# Patient Record
Sex: FEMALE | HISPANIC OR LATINO | Employment: PART TIME | URBAN - METROPOLITAN AREA
[De-identification: names, ages, dates, MRNs, and addresses within clinical notes are randomized per-mention and may not be internally consistent; named-entity substitution may affect disease eponyms.]

---

## 2021-03-10 ENCOUNTER — OFFICE VISIT (OUTPATIENT)
Dept: URGENT CARE | Facility: CLINIC | Age: 46
End: 2021-03-10
Payer: COMMERCIAL

## 2021-03-10 ENCOUNTER — TELEPHONE (OUTPATIENT)
Dept: URGENT CARE | Facility: CLINIC | Age: 46
End: 2021-03-10

## 2021-03-10 VITALS
OXYGEN SATURATION: 99 % | WEIGHT: 120.4 LBS | HEIGHT: 62 IN | SYSTOLIC BLOOD PRESSURE: 116 MMHG | DIASTOLIC BLOOD PRESSURE: 70 MMHG | HEART RATE: 104 BPM | BODY MASS INDEX: 22.16 KG/M2 | TEMPERATURE: 99.6 F | RESPIRATION RATE: 18 BRPM

## 2021-03-10 DIAGNOSIS — L50.9 URTICARIAL RASH: ICD-10-CM

## 2021-03-10 PROCEDURE — 99204 OFFICE O/P NEW MOD 45 MIN: CPT | Performed by: NURSE PRACTITIONER

## 2021-03-10 RX ORDER — METHYLPREDNISOLONE SODIUM SUCCINATE 125 MG/2ML
125 INJECTION, POWDER, LYOPHILIZED, FOR SOLUTION INTRAMUSCULAR; INTRAVENOUS ONCE
Status: COMPLETED | OUTPATIENT
Start: 2021-03-10 | End: 2021-03-10

## 2021-03-10 RX ORDER — METHYLPREDNISOLONE 4 MG/1
TABLET ORAL
Qty: 21 TABLET | Refills: 0 | Status: SHIPPED | OUTPATIENT
Start: 2021-03-10

## 2021-03-10 RX ADMIN — METHYLPREDNISOLONE SODIUM SUCCINATE 125 MG: 125 INJECTION, POWDER, LYOPHILIZED, FOR SOLUTION INTRAMUSCULAR; INTRAVENOUS at 12:13

## 2021-03-10 ASSESSMENT — ENCOUNTER SYMPTOMS
PALPITATIONS: 0
VOMITING: 0
WHEEZING: 0
RHINORRHEA: 0
HEARTBURN: 0
COUGH: 0
ANOREXIA: 0
BACK PAIN: 0
ORTHOPNEA: 0
CHILLS: 0
NAUSEA: 0
SHORTNESS OF BREATH: 0
NAIL CHANGES: 0
SORE THROAT: 0
MYALGIAS: 0
DIZZINESS: 0
FATIGUE: 0
HEADACHES: 0
DIARRHEA: 0
TINGLING: 0
CONSTIPATION: 0
FEVER: 0
URTICARIA: 1
MEMORY LOSS: 0

## 2021-03-10 NOTE — PROGRESS NOTES
"Subjective:      Ping Yeboah is a 45 y.o. female who presents with Urticaria (x15 hrs)            Urticaria  This is a new problem. The current episode started yesterday. The problem has been gradually worsening since onset. The rash is diffuse. The rash is characterized by redness and itchiness. She was exposed to nothing. Pertinent negatives include no anorexia, congestion, cough, diarrhea, facial edema, fatigue, fever, joint pain, nail changes, rhinorrhea, shortness of breath, sore throat or vomiting. Past treatments include antihistamine and anti-itch cream. The treatment provided no relief. There is no history of allergies, asthma, eczema or varicella.       Review of Systems   Constitutional: Negative for chills, fatigue and fever.   HENT: Negative for congestion, ear pain, rhinorrhea and sore throat.    Respiratory: Negative for cough, shortness of breath and wheezing.    Cardiovascular: Negative for chest pain, palpitations, orthopnea and leg swelling.   Gastrointestinal: Negative for anorexia, constipation, diarrhea, heartburn, nausea and vomiting.   Musculoskeletal: Negative for back pain, joint pain and myalgias.   Skin: Positive for itching and rash. Negative for nail changes.   Neurological: Negative for dizziness, tingling and headaches.   Psychiatric/Behavioral: Negative for memory loss and suicidal ideas.   All other systems reviewed and are negative.         Objective:     /70 (BP Location: Right arm, Patient Position: Sitting)   Pulse (!) 104   Temp 37.6 °C (99.6 °F) (Tympanic)   Resp 18   Ht 1.575 m (5' 2\")   Wt 54.6 kg (120 lb 6.4 oz)   SpO2 99%   BMI 22.02 kg/m²      Physical Exam  Vitals reviewed.   Constitutional:       General: She is not in acute distress.     Appearance: Normal appearance. She is not ill-appearing.   HENT:      Head: Normocephalic and atraumatic.      Right Ear: External ear normal.      Left Ear: External ear normal.   Eyes:      Pupils: Pupils are equal, " round, and reactive to light.   Cardiovascular:      Rate and Rhythm: Regular rhythm. Tachycardia present.      Pulses: Normal pulses.      Heart sounds: No murmur. No friction rub. No gallop.    Pulmonary:      Effort: Pulmonary effort is normal. No respiratory distress.      Breath sounds: Normal breath sounds.   Abdominal:      General: Bowel sounds are normal. There is no distension.      Palpations: Abdomen is soft. There is no mass.   Musculoskeletal:         General: No tenderness. Normal range of motion.      Cervical back: Normal range of motion and neck supple. No tenderness.      Right lower leg: No edema.      Left lower leg: No edema.   Lymphadenopathy:      Cervical: No cervical adenopathy.   Skin:     General: Skin is warm and dry.      Findings: Rash present. Rash is urticarial.      Comments: Diffuse urticarial rash inclusive of face, trunk, bilateral upper and lower extremities sparing mouth, palms and soles.    Neurological:      Mental Status: She is alert and oriented to person, place, and time.   Psychiatric:         Mood and Affect: Mood normal.         Behavior: Behavior normal.                 Assessment/Plan:        1. Urticarial rash  methylPREDNISolone sod succ (SOLU-MEDROL) 125 MG injection 125 mg    methylPREDNISolone (MEDROL DOSEPAK) 4 MG Tablet Therapy Pack     Differential diagnosis, natural history, supportive care, and indications for immediate follow-up discussed at length.     F/u PCP for allergy testing.     Supportive care, differential diagnoses, and indications for immediate follow-up discussed with patient.    Pathogenesis of diagnosis discussed including typical length and natural progression. Patient expresses understanding and agrees to plan.     Instructed patient to follow up with PCP or return to clinic for worsening symptoms or symptoms that persist for 7 to 10 days     Please note that this dictation was created using voice recognition software. I have made every  reasonable attempt to correct obvious errors, but I expect that there are errors of grammar and possibly content that I did not discover before finalizing the note.

## 2021-03-11 NOTE — TELEPHONE ENCOUNTER
Pt called stating prescription could not be filled until Friday at current pharmacy and requested prescription be sent to [facility] on [address]. Called [facility] and had prescription filled and informed Pt. No questions/concerns.

## 2025-06-27 ENCOUNTER — OFFICE VISIT (OUTPATIENT)
Dept: PHYSICAL MEDICINE AND REHAB | Facility: MEDICAL CENTER | Age: 50
End: 2025-06-27
Payer: COMMERCIAL

## 2025-06-27 VITALS
HEIGHT: 62 IN | BODY MASS INDEX: 21.22 KG/M2 | HEART RATE: 97 BPM | DIASTOLIC BLOOD PRESSURE: 84 MMHG | SYSTOLIC BLOOD PRESSURE: 138 MMHG | TEMPERATURE: 97.8 F | OXYGEN SATURATION: 98 % | WEIGHT: 115.3 LBS

## 2025-06-27 DIAGNOSIS — M54.9 DORSALGIA: ICD-10-CM

## 2025-06-27 DIAGNOSIS — M79.18 GLUTEAL PAIN: ICD-10-CM

## 2025-06-27 DIAGNOSIS — M21.70 LEG LENGTH DISCREPANCY: ICD-10-CM

## 2025-06-27 DIAGNOSIS — M54.16 LUMBAR RADICULOPATHY: Primary | ICD-10-CM

## 2025-06-27 DIAGNOSIS — M79.10 MYALGIA: ICD-10-CM

## 2025-06-27 DIAGNOSIS — M43.16 SPONDYLOLISTHESIS OF LUMBAR REGION: ICD-10-CM

## 2025-06-27 DIAGNOSIS — M47.816 FACET ARTHRITIS OF LUMBAR REGION: ICD-10-CM

## 2025-06-27 DIAGNOSIS — Z71.82 EXERCISE COUNSELING: ICD-10-CM

## 2025-06-27 DIAGNOSIS — M47.9 SPONDYLOSIS: ICD-10-CM

## 2025-06-27 ASSESSMENT — PAIN SCALES - GENERAL: PAINLEVEL_OUTOF10: 7=MODERATE-SEVERE PAIN

## 2025-06-27 ASSESSMENT — PATIENT HEALTH QUESTIONNAIRE - PHQ9: CLINICAL INTERPRETATION OF PHQ2 SCORE: 0

## 2025-06-27 NOTE — PROGRESS NOTES
Physiatry (Physical Medicine and  Rehabilitation)       Patient Name: Ping Yeboah   Patient : 1975  PCP: Pelon Sanchez M.D.  MRN: [id number]     Date of service: See epic    Referring provider: Patricia Long A.P*    CHIEF COMPLAINT  Chief Complaint   Patient presents with    Establish Care     Radiculopathy, Lumbar Region         Ping Yeboah is a very pleasant female  who presents today with The primary encounter diagnosis was Lumbar radiculopathy. Diagnoses of Spondylosis, Facet arthritis of lumbar region, Exercise counseling, Spondylolisthesis of lumbar region, Myalgia, Gluteal pain, Dorsalgia, and Leg length discrepancy were also pertinent to this visit.    Verbal consent was obtained for Herb copilot: Yes      Medical records review:  I reviewed the note from the referring provider Patricia Long A.P* including the note dated 25.    Prior Relevant MSK/Interventional Procedures:   Patient has not attempted prior ortho/joint injections.   Patient has not had prior ortho/joint surgery.      services were used in the patient's primary language of [place].      Name or Number: Juan [id number]  Mode of interpretation: iPad      HPI:    History of Present Illness  The patient is a 49-year-old female who came in because of ongoing back and leg pain. She described having pain in both the front and back of her legs, along with pain in both knees. She said the pain has been at a level of 7 out of 10 for the past 5 months, but taking ibuprofen helps bring it down to a 5 out of 10. The pain is there all day long. She mentioned that she moved heavy furniture in late May while doing deep cleaning, which might have made the pain worse. She has work restrictions that prevent her from lifting more than 20 pounds, and she's also worried about her back making popping sounds. She hasn't noticed any changes in her urinary or bowel habits.    Back and Leg Pain  She shared that the  pain runs along her entire spine and goes down to her knees, with the pain being worse on the front side of her legs. She doesn't feel any weakness in her muscles and hasn't had any injections or surgeries for her back. She also hasn't had any fevers, chills, or night sweats. She described the pain as feeling like cramping and said her back cracks a lot. She feels her pain is worse now compared to when she had an x-ray in February. She was prescribed meloxicam, Flexeril, and gabapentin, but she stopped taking them because they made her too sleepy during the day. However, she found that taking them at night helped her sleep better. Meloxicam gave her some relief during the day, but not much. She has been doing physical therapy for the past 8 to 10 weeks, but it hasn't helped.  - Onset: Pain has been ongoing for the past 5 months, possibly worsened by moving heavy furniture in late May.  - Location: Pain in the entire spine, front and back of legs, and both knees.  - Duration: Persistent for the past 5 months.  - Character: Cramping pain, back cracking a lot.  - Alleviating/Aggravating Factors: Ibuprofen helps reduce pain from 7/10 to 5/10; meloxicam provides some relief; physical therapy for 8 to 10 weeks without improvement; pain possibly worsened by moving heavy furniture.  - Timing: Pain is present all day long.  - Severity: Pain level 7/10, reduced to 5/10 with ibuprofen; worse now compared to February.    She also mentioned that she feels like her right leg is shorter than her left. Her doctor suggested using foot soles, but she didn't like them and decided not to use them.    ALLERGIES  The patient had a rash with NAPROXEN.    MEDICATIONS  Current: Meloxicam, Flexeril, gabapentin.  Discontinued: Ibuprofen.    ROS:   Fever, Chills, Sweats: Denies  Involuntary Weight Loss: Denies  Bowel/bladder issues: denies   See HPI.   All other systems reviewed and negative.     OCCUPATIONAL history:     HOBBIES/ACTIVITIES:     GOALS OF TREATMENT: Symptom/Pain relief. improve function.      Psychological testing for pain as depression and pain commonly coexist and need to both be treated.     Opioid Risk Score: No value filed.      Interpretation of Opioid Risk Score   Score 0-3 = Low risk of abuse. Do UDS at least once per year.  Score 4-7 = Moderate risk of abuse. Do UDS 1-4 times per year.  Score 8+ = High risk of abuse. Refer to specialist.    PHQ      6/27/2025     4:20 PM   Depression Screen (PHQ-2/PHQ-9)   PHQ-2 Total Score 0       Interpretation of PHQ-9 Total Score   Score Severity   1-4 No Depression   5-9 Mild Depression   10-14 Moderate Depression   15-19 Moderately Severe Depression   20-27 Severe Depression      PMHx:   Past Medical History[1]    PSHx:   Past Surgical History[2]    Family history   History reviewed. No pertinent family history.    Medications: reviewed on epic.   Active Medications[3]     Allergies:   Allergies[4]    Social Hx:   Social History     Socioeconomic History    Marital status: Single     Spouse name: Not on file    Number of children: Not on file    Years of education: Not on file    Highest education level: Not on file   Occupational History    Not on file   Tobacco Use    Smoking status: Never    Smokeless tobacco: Never   Vaping Use    Vaping status: Never Used   Substance and Sexual Activity    Alcohol use: Never    Drug use: Never    Sexual activity: Not on file   Other Topics Concern    Not on file   Social History Narrative    Not on file     Social Drivers of Health     Financial Resource Strain: Not on file   Food Insecurity: Not on file   Transportation Needs: Not on file   Physical Activity: Not on file   Stress: Not on file   Social Connections: Not on file   Intimate Partner Violence: Not on file   Housing Stability: Not on file         EXAMINATION   Vitals: /84 (BP Location: Right arm, Patient Position: Sitting, BP Cuff Size: Adult)   Pulse 97   Temp  "36.6 °C (97.8 °F) (Temporal)   Ht 1.575 m (5' 2\")   Wt 52.3 kg (115 lb 4.8 oz)   SpO2 98%   Physical Exam:     A chaperone was offered to the patient during today's exam. Chaperone name: Evelyn Nuñez MA was present.      Body Habitus: Body mass index is 21.09 kg/m².  Appearance: Well-groomed, well-nourished, not disheveled  Eyes: No scleral icterus to suggest severe liver disease, no proptosis to suggest severe hyperthyroid  ENT -no obvious auditory deficits, no external lesions, moist mucus membranes   Skin -no rashes or lesions noted. No appreciable skin breakdown on exposed skin areas.    Respiratory-  breathing comfortably on room air, no audible wheezing, full sentences  Cardiovascular- No lower extremity edema noted.   Psychiatric- alert and oriented, calm, comfortable, cooperative     Musculoskeletal and Neuro:  Gait and station - normal gait with reciprocal pattern,  no presence/use of ambulatory device, no arm assistance with sit-to-stand, nonantalgic. no loss of balance during exam.  change in patient's demeanor with exam.    Grossly normal cranial nerve exam  Coordination grossly intact  Physical Exam  - Deep tendon reflex BLE: 2 out of 4  - No clonus on examination  - Manual motor testing L2-S1 BLE: 5 out of 5  - Light touch dermatomes L2-S1: Equal and symmetrical to her face  - Tenderness to palpation in the midline of the spine, thoracic down to the lumbar; generalized  - No paraspinal pain of the thoracic, lumbar regions  - Right gluteus pain  - No pain with palpation of the greater trochanter  - Difficulty with flexion and extension causing pain across her back, unable to extend her back  - Difficulty with standing to sit  - Negative straight leg raise, Lasegue's test of the left side  - Negative FAY, FADIR test, thigh thrust test  - Right leg: Pain located at the gluteus muscle with the straight leg raise test, pain with thigh thrust test and manipulation of her leg  - No significant difference " "in leg length after checking for leg discrepancy      MEDICAL DECISION MAKING    Medical records review: see under HPI section.     DATA    Labs:   Lab Results   Component Value Date/Time    SODIUM 138 2013 09:35 AM    POTASSIUM 3.6 2013 09:35 AM    CHLORIDE 104 2013 09:35 AM    CO2 25 2013 09:35 AM    ANION 9.0 2013 09:35 AM    GLUCOSE 109 (H) 2013 09:35 AM    BUN 11 2013 09:35 AM    CREATININE 0.60 2013 09:35 AM    CALCIUM 8.7 2013 09:35 AM    ASTSGOT 32 2013 09:35 AM    ALTSGPT 32 2013 09:35 AM    TBILIRUBIN 0.5 2013 09:35 AM    ALBUMIN 4.0 2013 09:35 AM    TOTPROTEIN 7.1 2013 09:35 AM    GLOBULIN 3.1 2013 09:35 AM    AGRATIO 1.3 2013 09:35 AM   ]    No results found for: \"PROTHROMBTM\", \"INR\"     Lab Results   Component Value Date/Time    WBC 7.5 2013 09:35 AM    RBC 4.73 2013 09:35 AM    HEMOGLOBIN 14.0 2013 09:35 AM    HEMATOCRIT 41.4 2013 09:35 AM    MCV 87.5 2013 09:35 AM    MCH 29.6 2013 09:35 AM    MCHC 33.9 2013 09:35 AM    MPV 5.8 (L) 2013 09:35 AM    NEUTSPOLYS 58.6 2013 09:35 AM    LYMPHOCYTES 35.0 2013 09:35 AM    MONOCYTES 3.1 2013 09:35 AM    EOSINOPHILS 2.5 2013 09:35 AM    BASOPHILS 0.7 2013 09:35 AM        No results found for: \"HBA1C\"     Imaging:   I personally reviewed following images, these are my reads    Not available    IMAGING radiology reads. I reviewed the following radiology reads                                                                                                                         ASSESSMENT AND PLAN:  Ping Yeboah   : 1975   Past medical history of migraines, GERD, iron deficiency, cervical radiculopathy, GERD, uterine adnexa, trigger finger of the right hand    Diagnoses and all orders for this visit:  1. Lumbar radiculopathy  DX-LUMBAR SPINE-2 OR 3 VIEWS    DX-HIP-BILATERAL-WITH " PELVIS-3/4 VIEWS      2. Spondylosis        3. Facet arthritis of lumbar region        4. Exercise counseling        5. Spondylolisthesis of lumbar region        6. Myalgia  Trigger Point Injections      7. Gluteal pain  DX-LUMBAR SPINE-2 OR 3 VIEWS    DX-HIP-BILATERAL-WITH PELVIS-3/4 VIEWS      8. Dorsalgia  DX-THORACIC SPINE-2 VIEWS      9. Leg length discrepancy  DX-BONE LENGTH STUDY            Assessment & Plan  1.  Lower back pain.  Differential includes lumbar radiculopathy, myalgia, vertebrogenic, hamstring tightness.  - Consider trigger point injections targeting the gluteus muscle, pending MRI review.  - Order x-rays of the hips, lower back, and upper back with generalized pain.  - Review MRI results to guide further treatment decisions.  - Recommend gabapentin and Flexeril at bedtime due to sedative effects.  - Advise meloxicam during the day with food and milk.  - Suggest Tylenol as an adjunct for daytime pain management.  - Provide home exercises for back strengthening.  - Staff to provide instructions regarding x-rays and trigger point injections.    2. Suspected leg length discrepancy informed by PCP and PT.  - Order x-ray to confirm leg length discrepancy and assess the need for an insert.    Follow-up  - Plan to administer trigger point injections within 1 to 2 weeks, pending insurance authorization.    -Recommend vitamin D screening; Hypovitaminosis D can contribute to diffuse musculoskeletal aches, fatigue, and malaise and can affect exercise and therapy    Orders Placed This Encounter    Trigger Point Injections    DX-LUMBAR SPINE-2 OR 3 VIEWS    DX-HIP-BILATERAL-WITH PELVIS-3/4 VIEWS    DX-THORACIC SPINE-2 VIEWS    DX-BONE LENGTH STUDY       -Medications/Modalities:  see above  -Therapy (PT/OT/Aquatherapy): encouraged to continue at [facility]  -Home exercise program: encouraged    -Diagnostic workup: reviewed today as above; ordered X-ray  -Interventional program: will schedule an outpatient  procedure at a later time: TPI right gluteal depending on MRI results  -Referrals: none required at this time  -Outside records requested: The patient signed Outside Records Request Form for her outside records including images. [facility]     Please note that this dictation was created using voice recognition software. I have made every reasonable attempt to correct obvious errors but there may be errors of grammar and content that I may have overlooked prior to finalization of this note.      Bonilla Nickerson DO  Interventional Pain and Spine  Physical Medicine and Rehabilitation  [facility]         CC Pelon Sanchez M.D.   CC Patricia Long, A.P*         [1] History reviewed. No pertinent past medical history.  [2] History reviewed. No pertinent surgical history.  [3]   Outpatient Medications Marked as Taking for the 6/27/25 encounter (Office Visit) with Bonilla Nickerson D.O.   Medication Sig Dispense Refill    methylPREDNISolone (MEDROL DOSEPAK) 4 MG Tablet Therapy Pack Follow schedule on package instructions. 21 tablet 0   [4] No Known Allergies

## 2025-06-27 NOTE — Clinical Note
Patricia Long and Dr Pelon Sanchez   Ping Yeboah was evaluated for low back pain.  Considering her back pain secondary to myalgia but she also has generalized midline pain which just can be imaged with x-rays and have her follow-up in 2 weeks.  She also mention a leg length discrepancy and will obtain x-rays.  Lastly, consider vitamin D level as this can affect  toleration to exercise and physical therapy.  Only recommending that she consider just taking the Flexeril and gabapentin at night since it is making her drowsy.  Suggested that she continue with the treatment with meloxicam and to consider the adjunct of Tylenol during the daytime  Thank you for the referral.   Please text, voalte, or call me if further questions.       Best Regards,   Bonilla Nickerson, DO   Physical Medicine and Rehabilitation

## 2025-06-29 ENCOUNTER — HOSPITAL ENCOUNTER (OUTPATIENT)
Dept: RADIOLOGY | Facility: MEDICAL CENTER | Age: 50
End: 2025-06-29
Attending: GENERAL PRACTICE
Payer: COMMERCIAL

## 2025-06-29 DIAGNOSIS — M79.18 GLUTEAL PAIN: ICD-10-CM

## 2025-06-29 DIAGNOSIS — M54.9 DORSALGIA: ICD-10-CM

## 2025-06-29 DIAGNOSIS — M54.16 LUMBAR RADICULOPATHY: ICD-10-CM

## 2025-06-29 PROCEDURE — 72070 X-RAY EXAM THORAC SPINE 2VWS: CPT

## 2025-06-29 PROCEDURE — 72100 X-RAY EXAM L-S SPINE 2/3 VWS: CPT

## 2025-07-03 ENCOUNTER — OFFICE VISIT (OUTPATIENT)
Dept: PHYSICAL MEDICINE AND REHAB | Facility: MEDICAL CENTER | Age: 50
End: 2025-07-03
Attending: GENERAL PRACTICE
Payer: COMMERCIAL

## 2025-07-03 VITALS
HEART RATE: 80 BPM | TEMPERATURE: 98 F | OXYGEN SATURATION: 98 % | BODY MASS INDEX: 21.16 KG/M2 | WEIGHT: 115 LBS | HEIGHT: 62 IN | SYSTOLIC BLOOD PRESSURE: 142 MMHG | DIASTOLIC BLOOD PRESSURE: 86 MMHG

## 2025-07-03 DIAGNOSIS — M79.10 MYALGIA: ICD-10-CM

## 2025-07-03 DIAGNOSIS — M47.9 SPONDYLOSIS: Primary | ICD-10-CM

## 2025-07-03 DIAGNOSIS — M43.16 SPONDYLOLISTHESIS OF LUMBAR REGION: ICD-10-CM

## 2025-07-03 DIAGNOSIS — M47.816 FACET ARTHRITIS OF LUMBAR REGION: ICD-10-CM

## 2025-07-03 DIAGNOSIS — Z71.82 EXERCISE COUNSELING: ICD-10-CM

## 2025-07-03 PROCEDURE — 76942 ECHO GUIDE FOR BIOPSY: CPT | Performed by: GENERAL PRACTICE

## 2025-07-03 PROCEDURE — 1125F AMNT PAIN NOTED PAIN PRSNT: CPT | Performed by: GENERAL PRACTICE

## 2025-07-03 PROCEDURE — 20553 NJX 1/MLT TRIGGER POINTS 3/>: CPT | Performed by: GENERAL PRACTICE

## 2025-07-03 PROCEDURE — 99213 OFFICE O/P EST LOW 20 MIN: CPT | Mod: 25 | Performed by: GENERAL PRACTICE

## 2025-07-03 PROCEDURE — 3079F DIAST BP 80-89 MM HG: CPT | Performed by: GENERAL PRACTICE

## 2025-07-03 PROCEDURE — 3077F SYST BP >= 140 MM HG: CPT | Performed by: GENERAL PRACTICE

## 2025-07-03 RX ORDER — BUPIVACAINE HYDROCHLORIDE 5 MG/ML
10 INJECTION, SOLUTION EPIDURAL; INTRACAUDAL; PERINEURAL ONCE
Status: COMPLETED | OUTPATIENT
Start: 2025-07-03 | End: 2025-07-03

## 2025-07-03 RX ADMIN — Medication 5 ML: at 17:13

## 2025-07-03 RX ADMIN — BUPIVACAINE HYDROCHLORIDE 10 ML: 5 INJECTION, SOLUTION EPIDURAL; INTRACAUDAL; PERINEURAL at 17:12

## 2025-07-03 ASSESSMENT — PAIN SCALES - GENERAL: PAINLEVEL_OUTOF10: 7=MODERATE-SEVERE PAIN

## 2025-07-03 ASSESSMENT — PATIENT HEALTH QUESTIONNAIRE - PHQ9: CLINICAL INTERPRETATION OF PHQ2 SCORE: 0

## 2025-07-03 NOTE — PROGRESS NOTES
Physiatry (Physical Medicine and  Rehabilitation)       Patient Name: Ping Yeboah   Patient : 1975  PCP: Pelon Sanchez M.D.  MRN: [id number]     Date of service: See epic    Referring provider: Patricia Long A.P*    CHIEF COMPLAINT  Chief Complaint   Patient presents with    Injections     TPI         Ping Yeboah is a very pleasant Finnish-speaking female  who presents today with The primary encounter diagnosis was Spondylosis. Diagnoses of Myalgia, Facet arthritis of lumbar region, Spondylolisthesis of lumbar region, and Exercise counseling were also pertinent to this visit.    Verbal consent was obtained for Herb copilot: Yes      Medical records review:  I reviewed the note from the referring provider Patricia Long A.P* including the note dated 25.    Prior Relevant MSK/Interventional Procedures:   Patient has not attempted prior ortho/joint injections.   Patient has not had prior ortho/joint surgery.       HPI:     services were used in the patient's primary language of Finnish.      Name or Number: [id number] [id number]  Mode of interpretation: iPad    History of Present Illness  The patient is a 49-year-old female presenting for a trigger point injection, with x-rays completed and having paraspinal, thoracic, and lumbar pain.    Paraspinal, Thoracic, and Lumbar Pain  She has been on medication for an extended period, but her pain remains unrelieved. Her primary care physician, Dr. Bradford, has informed her that no additional medications will be prescribed as she is currently under treatment here. If she requires any medication for pain, she is to request it from this provider. She expresses a preference for injections over oral medication due to the lack of improvement in her pain despite long-term medication use. She was recently seen by her primary care physician and was prescribed meloxicam, Flexeril, and gabapentin, which have not significantly alleviated her  symptoms.  - Onset: Pain has been ongoing for an extended period.  - Location: Paraspinal, thoracic, and lumbar regions.  - Duration: Extended period.  - Character: Persistent pain.  - Alleviating/Aggravating Factors: Preference for injections over oral medication; meloxicam, Flexeril, and gabapentin have not significantly alleviated symptoms.  - Severity: Pain remains unrelieved despite long-term medication use.    MEDICATIONS  Current: Meloxicam, Flexeril, gabapentin    ROS:   Fever, Chills, Sweats: Denies  Involuntary Weight Loss: Denies  Bowel/bladder issues: denies   See HPI.   All other systems reviewed and negative.     OCCUPATIONAL history:    HOBBIES/ACTIVITIES:     GOALS OF TREATMENT: Symptom/Pain relief. improve function.      Psychological testing for pain as depression and pain commonly coexist and need to both be treated.     Opioid Risk Score: No value filed.      Interpretation of Opioid Risk Score   Score 0-3 = Low risk of abuse. Do UDS at least once per year.  Score 4-7 = Moderate risk of abuse. Do UDS 1-4 times per year.  Score 8+ = High risk of abuse. Refer to specialist.    PHQ      6/27/2025     4:20 PM 7/3/2025     2:40 PM   Depression Screen (PHQ-2/PHQ-9)   PHQ-2 Total Score 0 0       Interpretation of PHQ-9 Total Score   Score Severity   1-4 No Depression   5-9 Mild Depression   10-14 Moderate Depression   15-19 Moderately Severe Depression   20-27 Severe Depression      PMHx:   Past Medical History[1]    PSHx:   Past Surgical History[2]    Family history   History reviewed. No pertinent family history.    Medications: reviewed on epic.   Active Medications[3]     Allergies:   Allergies[4]    Social Hx:   Social History     Socioeconomic History    Marital status: Single     Spouse name: Not on file    Number of children: Not on file    Years of education: Not on file    Highest education level: Not on file   Occupational History    Not on file   Tobacco Use    Smoking status: Never  "   Smokeless tobacco: Never   Vaping Use    Vaping status: Never Used   Substance and Sexual Activity    Alcohol use: Never    Drug use: Never    Sexual activity: Not on file   Other Topics Concern    Not on file   Social History Narrative    Not on file     Social Drivers of Health     Financial Resource Strain: Not on file   Food Insecurity: Not on file   Transportation Needs: Not on file   Physical Activity: Not on file   Stress: Not on file   Social Connections: Not on file   Intimate Partner Violence: Not on file   Housing Stability: Not on file         EXAMINATION   Vitals: BP (!) 142/86 (BP Location: Right arm, Patient Position: Sitting, BP Cuff Size: Adult)   Pulse 80   Temp 36.7 °C (98 °F) (Temporal)   Ht 1.575 m (5' 2\")   Wt 52.2 kg (115 lb)   SpO2 98%   Physical Exam:     A chaperone was offered to the patient during today's exam. Chaperone name: Evelyn Nuñez MA was present.      Body Habitus: Body mass index is 21.03 kg/m².  Appearance: Well-groomed, well-nourished, not disheveled  Eyes: No scleral icterus to suggest severe liver disease, no proptosis to suggest severe hyperthyroid  ENT -no obvious auditory deficits, no external lesions, moist mucus membranes   Skin -no rashes or lesions noted. No appreciable skin breakdown on exposed skin areas.    Respiratory-  breathing comfortably on room air, no audible wheezing, full sentences  Cardiovascular- No lower extremity edema noted.   Psychiatric- alert and oriented, calm, comfortable, cooperative     Musculoskeletal and Neuro:  Gait and station - normal gait with reciprocal pattern,  no presence/use of ambulatory device, no arm assistance with sit-to-stand, nonantalgic. no loss of balance during exam.  change in patient's demeanor with exam.    Grossly normal cranial nerve exam  Coordination grossly intact  Physical Exam  - Deep tendon reflex BLE: 2 out of 4  - No clonus on examination  - Manual motor testing L2-S1 BLE: 5 out of 5  - Light touch " "dermatomes L2-S1: Equal and symmetrical to her face  - Tenderness to palpation in the midline of the spine, thoracic down to the lumbar; generalized  - No paraspinal pain of the thoracic, lumbar regions  - Right gluteus pain  - No pain with palpation of the greater trochanter  - Difficulty with flexion and extension causing pain across her back, unable to extend her back  - Difficulty with standing to sit  - Negative straight leg raise, Lasegue's test of the left side  - Negative FAY, FADIR test, thigh thrust test  - Right leg: Pain located at the gluteus muscle with the straight leg raise test, pain with thigh thrust test and manipulation of her leg  - No significant difference in leg length after checking for leg discrepancy      MEDICAL DECISION MAKING    Medical records review: see under HPI section.     DATA    Labs:   Lab Results   Component Value Date/Time    SODIUM 138 05/13/2013 09:35 AM    POTASSIUM 3.6 05/13/2013 09:35 AM    CHLORIDE 104 05/13/2013 09:35 AM    CO2 25 05/13/2013 09:35 AM    ANION 9.0 05/13/2013 09:35 AM    GLUCOSE 109 (H) 05/13/2013 09:35 AM    BUN 11 05/13/2013 09:35 AM    CREATININE 0.60 05/13/2013 09:35 AM    CALCIUM 8.7 05/13/2013 09:35 AM    ASTSGOT 32 05/13/2013 09:35 AM    ALTSGPT 32 05/13/2013 09:35 AM    TBILIRUBIN 0.5 05/13/2013 09:35 AM    ALBUMIN 4.0 05/13/2013 09:35 AM    TOTPROTEIN 7.1 05/13/2013 09:35 AM    GLOBULIN 3.1 05/13/2013 09:35 AM    AGRATIO 1.3 05/13/2013 09:35 AM   ]    No results found for: \"PROTHROMBTM\", \"INR\"     Lab Results   Component Value Date/Time    WBC 7.5 05/13/2013 09:35 AM    RBC 4.73 05/13/2013 09:35 AM    HEMOGLOBIN 14.0 05/13/2013 09:35 AM    HEMATOCRIT 41.4 05/13/2013 09:35 AM    MCV 87.5 05/13/2013 09:35 AM    MCH 29.6 05/13/2013 09:35 AM    MCHC 33.9 05/13/2013 09:35 AM    MPV 5.8 (L) 05/13/2013 09:35 AM    NEUTSPOLYS 58.6 05/13/2013 09:35 AM    LYMPHOCYTES 35.0 05/13/2013 09:35 AM    MONOCYTES 3.1 05/13/2013 09:35 AM    EOSINOPHILS 2.5 " "2013 09:35 AM    BASOPHILS 0.7 2013 09:35 AM        No results found for: \"HBA1C\"     Imaging:   I personally reviewed following images, these are my reads    2025 thoracic x-ray: Very minimal S-shaped curve; degenerative changes; osteophytes; multiple levels of loss of disc height  2025 lumbar x-ray: Degenerative changes; lower level facet arthropathy L4-L5 and L5-S1; anterolisthesis L4; osteophytes    IMAGING radiology reads. I reviewed the following radiology reads                                                                                                                         ASSESSMENT AND PLAN:  Ping Yeboah   : 1975   Past medical history of migraines, GERD, iron deficiency, cervical radiculopathy, GERD, uterine adnexa, trigger finger of the right hand    Diagnoses and all orders for this visit:  1. Spondylosis        2. Myalgia  Trigger Point Injections    lidocaine (Xylocaine) 1 % injection 5 mL    bupivacaine (pf) (Marcaine/Sensorcaine) 0.5 % injection 10 mL    Consent for all Surgical, Special Diagnostic or Therapeutic Procedures      3. Facet arthritis of lumbar region        4. Spondylolisthesis of lumbar region        5. Exercise counseling              Assessment & Plan  The current working diagnosis is myalgia, characterized by muscle dysfunction and tenderness to palpation. The muscles can be affected if they are not flexible, if they do not have the strength, endurance, and the coordination with movements. X-rays were reviewed to rule out bone abnormalities. The patient has been on meloxicam, Flexeril, and gabapentin prescribed by her primary care physician, but these have not significantly alleviated her symptoms. She prefers injections over medication changes. A diagnostic injection with lidocaine and bupivacaine will be administered today to assess pain reduction over the next 24 hours. The risks of pain, bleeding, and infection were discussed, and " ultrasound guidance will be used to ensure correct needle placement. Alcohol will be used to clean each site to reduce infection risk. The primary care physician will continue to manage her medications.    Follow-up: The patient will follow up in 2 weeks for reassessment.    PROCEDURE  Procedure Performed  Diagnostic injection    Anesthesia  Lidocaine and bupivacaine    -will endorse current medication regimen by the PCP. I will not be adding or changing medications.  Asides from muscle relaxants and NSAIDs, gabapentin and other antispasmodics can be considered for pain control.    -Recommend vitamin D screening; Hypovitaminosis D can contribute to diffuse musculoskeletal aches, fatigue, and malaise and can affect exercise and therapy    Orders Placed This Encounter    lidocaine (Xylocaine) 1 % injection 5 mL    bupivacaine (pf) (Marcaine/Sensorcaine) 0.5 % injection 10 mL    Consent for all Surgical, Special Diagnostic or Therapeutic Procedures       -Medications/Modalities:  see above  -Therapy (PT/OT/Aquatherapy): encouraged to continue at [facility]  -Home exercise program: encouraged    -Diagnostic workup: reviewed today as above; ordered X-ray  -Interventional program: will perform with this visit  Patient would like to proceed with injection of trigger point injections without corticosteroid.  The risks, benefits, and alternatives to this procedure were discussed and the patient wishes to proceed with the procedure. Risks include but are not limited to damage to surrounding structures, infection, bleeding, worsening of pain which can be permanent, and weakness which can be permanent. Benefits include pain relief and improved function. Alternatives include not doing the procedure.   Discussed with patient that we will hold off on corticosteroids as this will be a diagnostic and potentially therapeutic treatment. May consider corticosteroids on follow up appointment.   -Referrals: none required at this  time  -Outside records requested: The patient signed Outside Records Request Form for her outside records including images. [facility]     Please note that this dictation was created using voice recognition software. I have made every reasonable attempt to correct obvious errors but there may be errors of grammar and content that I may have overlooked prior to finalization of this note.      Bonilla Nickerson DO  Interventional Pain and Spine  Physical Medicine and Rehabilitation  [facility]         CC Pelon Sanchez M.D.   CC Patricia Long, A.P*         [1] No past medical history on file.  [2] History reviewed. No pertinent surgical history.  [3]   No outpatient medications have been marked as taking for the 7/3/25 encounter (Office Visit) with Bonilla Nickerson D.O..   [4]   Allergies  Allergen Reactions    Naproxen Hives

## 2025-07-04 NOTE — PROCEDURES
TRIGGER POINT INJECTION     Date of Service: 7/3/2025     Physician/s: Bonilla Nickerson D.O.    Pre-operative Diagnosis: Myalgia (M79.1)    Post-operative Diagnosis: Myalgia (M79.1)    Procedure: Right trapezius and levator scapula and rhomboids and supraspinatus; bilateral thoracic paraspinal muscles; bilateral thoracic lumbar paraspinal muscles; right gluteus rick trigger point injections    The risks, benefits, and alternatives of the procedure were reviewed and discussed with the patient.  Written informed consent was freely obtained. A pre-procedural time-out was conducted by the physician verifying patient’s identity, procedure to be performed, procedure site and side, and allergy verification. Appropriate equipment was determined to be in place for the procedure.     The site as well as the side was identified  and the patient was counseled on the procedure along with the side effects, which are not limited to increased pain, hematoma, atrophy of tissues, infection, nerve damage, CRPS (RSD), skin dimpling, loss of skin/subcutaneous tissue/fat tissue, paraplegia, tetraplegia, septicemia, and other known and unknown effects which might be harmful/fatal.  The patient understood the same and verbalized agreement. It was carried out as below:     Solution used:   -total 15ml mixed please; 5ml 1% lidocaine, 10ml bupivacaine, 0ml 4mg/ml dexamethasone    Total solution used:  15    Injected: Myofascial trigger points at the above muscle(s) was/(were) inactivated via needling procedure under aseptic precautions.      In the office suite exam room the patient was placed in a sitting position and prone position for gluteus and the skin areas for injection over the above muscle(s) was/were marked. A solution was prepared as above. Ultrasound was confirmed to view the adjacent structures for blood vessels and nerves and to confirm the needle path was not within the structures nor was it too deep to be within the lung or  organs. A 27g needle was placed into each of the markings at the areas above under ultrasound guidance with an in plane approach.. After negative aspiration, approximately a 1.5 mL of the above solution was injected. The needle was removed intact after each trigger point injection, and the patient's back was covered with a 4x4 gauze, gentle soft tissue massage, and a dressing was applied. There were no complications noted. The images were uploaded to our media tab for permanent storage.    Postprocedure care explained to the patient regarding icing as well as stretching activity.      Instructed patient to avoid submerging in water for at least 24 to 48 hours.    Instructed patient to monitor injection site for signs of infection.    Preprocedural pain: 7/10  Postprocedural pain: 2/10 gluteus and 0-1/10 for remaing    Trigger point injection, 3 or more muscles [id number]  Ultrasonic guidance for needle placement: [id number]      Bonilla Nickerson DO  Physical Medicine and Rehabilitation  [facility]

## 2025-07-17 ENCOUNTER — OFFICE VISIT (OUTPATIENT)
Dept: PHYSICAL MEDICINE AND REHAB | Facility: MEDICAL CENTER | Age: 50
End: 2025-07-17
Payer: COMMERCIAL

## 2025-07-17 VITALS
BODY MASS INDEX: 21.16 KG/M2 | SYSTOLIC BLOOD PRESSURE: 138 MMHG | TEMPERATURE: 98 F | WEIGHT: 115 LBS | HEIGHT: 62 IN | OXYGEN SATURATION: 98 % | DIASTOLIC BLOOD PRESSURE: 72 MMHG | HEART RATE: 86 BPM

## 2025-07-17 DIAGNOSIS — Z71.82 EXERCISE COUNSELING: ICD-10-CM

## 2025-07-17 DIAGNOSIS — M47.816 FACET ARTHRITIS OF LUMBAR REGION: ICD-10-CM

## 2025-07-17 DIAGNOSIS — M54.9 DORSALGIA: ICD-10-CM

## 2025-07-17 DIAGNOSIS — M79.10 MYALGIA: Primary | ICD-10-CM

## 2025-07-17 DIAGNOSIS — M79.18 GLUTEAL PAIN: ICD-10-CM

## 2025-07-17 DIAGNOSIS — M43.16 SPONDYLOLISTHESIS OF LUMBAR REGION: ICD-10-CM

## 2025-07-17 DIAGNOSIS — M47.9 SPONDYLOSIS: ICD-10-CM

## 2025-07-17 DIAGNOSIS — M21.70 LEG LENGTH DISCREPANCY: ICD-10-CM

## 2025-07-17 PROCEDURE — 76942 ECHO GUIDE FOR BIOPSY: CPT | Performed by: GENERAL PRACTICE

## 2025-07-17 PROCEDURE — 1125F AMNT PAIN NOTED PAIN PRSNT: CPT | Performed by: GENERAL PRACTICE

## 2025-07-17 PROCEDURE — 3078F DIAST BP <80 MM HG: CPT | Performed by: GENERAL PRACTICE

## 2025-07-17 PROCEDURE — 3075F SYST BP GE 130 - 139MM HG: CPT | Performed by: GENERAL PRACTICE

## 2025-07-17 PROCEDURE — 20553 NJX 1/MLT TRIGGER POINTS 3/>: CPT | Performed by: GENERAL PRACTICE

## 2025-07-17 PROCEDURE — 99214 OFFICE O/P EST MOD 30 MIN: CPT | Mod: 25 | Performed by: GENERAL PRACTICE

## 2025-07-17 RX ORDER — DEXAMETHASONE SODIUM PHOSPHATE 4 MG/ML
4 INJECTION, SOLUTION INTRA-ARTICULAR; INTRALESIONAL; INTRAMUSCULAR; INTRAVENOUS; SOFT TISSUE ONCE
Status: COMPLETED | OUTPATIENT
Start: 2025-07-17 | End: 2025-07-17

## 2025-07-17 RX ADMIN — Medication 15 ML: at 17:21

## 2025-07-17 RX ADMIN — DEXAMETHASONE SODIUM PHOSPHATE 4 MG: 4 INJECTION, SOLUTION INTRA-ARTICULAR; INTRALESIONAL; INTRAMUSCULAR; INTRAVENOUS; SOFT TISSUE at 17:20

## 2025-07-17 ASSESSMENT — PATIENT HEALTH QUESTIONNAIRE - PHQ9: CLINICAL INTERPRETATION OF PHQ2 SCORE: 0

## 2025-07-17 ASSESSMENT — PAIN SCALES - GENERAL: PAINLEVEL_OUTOF10: 7=MODERATE-SEVERE PAIN

## 2025-07-17 NOTE — PROCEDURES
TRIGGER POINT INJECTION     Date of Service: 7/17/2025     Physician/s: Bonilla Nickerson D.O.    Pre-operative Diagnosis: Myalgia (M79.1)    Post-operative Diagnosis: Myalgia (M79.1)    Procedure: Right trapezius and levator scapula and rhomboids and supraspinatus; right thoracic paraspinal muscles; bilateral lumbar paraspinal muscles; right gluteus rick trigger point injections    The risks, benefits, and alternatives of the procedure were reviewed and discussed with the patient.  Written informed consent was freely obtained. A pre-procedural time-out was conducted by the physician verifying patient’s identity, procedure to be performed, procedure site and side, and allergy verification. Appropriate equipment was determined to be in place for the procedure.     The site as well as the side was identified  and the patient was counseled on the procedure along with the side effects, which are not limited to increased pain, hematoma, atrophy of tissues, infection, nerve damage, CRPS (RSD), skin dimpling, loss of skin/subcutaneous tissue/fat tissue, paraplegia, tetraplegia, septicemia, and other known and unknown effects which might be harmful/fatal.  The patient understood the same and verbalized agreement. It was carried out as below:     Solution used:   -total 15ml mixed please; 5ml 1% lidocaine, 0ml bupivacaine, 1ml 4mg/ml dexamethasone    Total solution used:  16    Injected: Myofascial trigger points at the above muscle(s) was/(were) inactivated via needling procedure under aseptic precautions.      In the office suite exam room the patient was placed in a sitting position and prone position for gluteus and the skin areas for injection over the above muscle(s) was/were marked. A solution was prepared as above. Ultrasound was confirmed to view the adjacent structures for blood vessels and nerves and to confirm the needle path was not within the structures nor was it too deep to be within the lung or organs. A 27g  needle was placed into each of the markings at the areas above under ultrasound guidance with an in plane approach.. After negative aspiration, approximately a 1.5 mL of the above solution was injected. The needle was removed intact after each trigger point injection, and the patient's back was covered with a 4x4 gauze, gentle soft tissue massage, and a dressing was applied. There were no complications noted. The images were uploaded to our media tab for permanent storage.    Postprocedure care explained to the patient regarding icing as well as stretching activity.      Instructed patient to avoid submerging in water for at least 24 to 48 hours.    Instructed patient to monitor injection site for signs of infection.    Preprocedural pain: 7/10  Postprocedural pain: 4/10 gluteus but improved range of motion    Trigger point injection, 3 or more muscles [id number]  Ultrasonic guidance for needle placement: [id number]      Bonilla Nickerson DO  Physical Medicine and Rehabilitation  [facility]

## 2025-07-17 NOTE — PROGRESS NOTES
Physiatry (Physical Medicine and  Rehabilitation)       Patient Name: Ping Yeboah   Patient : 1975  PCP: Pelon Sanchez M.D.  MRN: [id number]     Date of service: See epic      CHIEF COMPLAINT  Chief Complaint   Patient presents with    Follow-Up     2wk fv         Ping Yeboah is a very pleasant Gabonese-speaking female  who presents today with The primary encounter diagnosis was Myalgia. Diagnoses of Spondylosis, Facet arthritis of lumbar region, Spondylolisthesis of lumbar region, Exercise counseling, Gluteal pain, Dorsalgia, and Leg length discrepancy were also pertinent to this visit.    Verbal consent was obtained for Herb copilot: Yes      Medical records review:  I reviewed the note from the referring provider Patricia Long A.P* including the note dated 25.    Prior Relevant MSK/Interventional Procedures:   7/3/25 Right trapezius and levator scapula and rhomboids and supraspinatus; bilateral thoracic paraspinal muscles; bilateral thoracic lumbar paraspinal muscles; right gluteus rick trigger point injections w/o CS    My prior notes  - PCP had prescribed her meloxicam Flexeril and gabapentin which did not alleviate her symptoms      HPI:     services were used in the patient's primary language of Gabonese.      Name or Number: [id number]  Mode of interpretation: iPad    History of Present Illness  The patient is a 49-year-old female presenting for a 2-week follow-up after trigger point injections without corticosteroid.    Lower and upper back Pain  She reports that her current pain level is 6 to 7 out of 10. She has not made any medication changes and does not experience weight loss, fever, chills, night sweats, or bowel or bladder issues. She describes achiness along the right side upper paraspinal muscles from the cervical thoracic lumbar region and her right gluteus region.  - Onset: Pain returned on Saturday after initial relief.  - Location: Right side  upper paraspinal muscles from the cervical thoracic lumbar region and right gluteus region.  - Duration: Relief for 4 to 5 days post-injection; pain returned on Saturday.  - Character: Achiness.  - Severity: Current pain level is 6 to 7 out of 10.    Two weeks ago, she received trigger point injections. She experienced relief from back pain for 4 to 5 days post-injection. Although she continued to feel hip pain, it was less intense than before. However, the pain returned on Saturday.    ROS:   Fever, Chills, Sweats: Denies  Involuntary Weight Loss: Denies  Bowel/bladder issues: denies   See HPI.   All other systems reviewed and negative.     OCCUPATIONAL history:    HOBBIES/ACTIVITIES:     GOALS OF TREATMENT: Symptom/Pain relief. improve function.      Psychological testing for pain as depression and pain commonly coexist and need to both be treated.     Opioid Risk Score: No value filed.      Interpretation of Opioid Risk Score   Score 0-3 = Low risk of abuse. Do UDS at least once per year.  Score 4-7 = Moderate risk of abuse. Do UDS 1-4 times per year.  Score 8+ = High risk of abuse. Refer to specialist.    PHQ      6/27/2025     4:20 PM 7/3/2025     2:40 PM 7/17/2025     4:00 PM   Depression Screen (PHQ-2/PHQ-9)   PHQ-2 Total Score 0 0 0       Interpretation of PHQ-9 Total Score   Score Severity   1-4 No Depression   5-9 Mild Depression   10-14 Moderate Depression   15-19 Moderately Severe Depression   20-27 Severe Depression      PMHx:   Past Medical History[1]    PSHx:   Past Surgical History[2]    Family history   History reviewed. No pertinent family history.    Medications: reviewed on epic.   Active Medications[3]     Allergies:   Allergies[4]    Social Hx:   Social History     Socioeconomic History    Marital status: Single     Spouse name: Not on file    Number of children: Not on file    Years of education: Not on file    Highest education level: Not on file   Occupational History    Not on file  "  Tobacco Use    Smoking status: Never    Smokeless tobacco: Never   Vaping Use    Vaping status: Never Used   Substance and Sexual Activity    Alcohol use: Never    Drug use: Never    Sexual activity: Not on file   Other Topics Concern    Not on file   Social History Narrative    Not on file     Social Drivers of Health     Financial Resource Strain: Not on file   Food Insecurity: Not on file   Transportation Needs: Not on file   Physical Activity: Not on file   Stress: Not on file   Social Connections: Not on file   Intimate Partner Violence: Not on file   Housing Stability: Not on file         EXAMINATION   Vitals: /72 (BP Location: Right arm, Patient Position: Sitting, BP Cuff Size: Adult)   Pulse 86   Temp 36.7 °C (98 °F) (Temporal)   Ht 1.575 m (5' 2\")   Wt 52.2 kg (115 lb)   SpO2 98%   Physical Exam:     A chaperone was offered to the patient during today's exam. Chaperone name: Evelyn Nuñez MA was present.      Body Habitus: Body mass index is 21.03 kg/m².  Appearance: Well-groomed, well-nourished, not disheveled  Eyes: No scleral icterus to suggest severe liver disease, no proptosis to suggest severe hyperthyroid  ENT -no obvious auditory deficits, no external lesions, moist mucus membranes   Skin -no rashes or lesions noted. No appreciable skin breakdown on exposed skin areas.    Respiratory-  breathing comfortably on room air, no audible wheezing, full sentences  Cardiovascular- No lower extremity edema noted.   Psychiatric- alert and oriented, calm, comfortable, cooperative     Musculoskeletal and Neuro:  Gait and station - normal gait with reciprocal pattern,  no presence/use of ambulatory device, no arm assistance with sit-to-stand, nonantalgic. no loss of balance during exam.  change in patient's demeanor with exam.    Grossly normal cranial nerve exam  Coordination grossly intact    Reproducible tenderness to palpation right lumbar and thoracic paraspinal muscles and in right gluteus " "rick muscles and lower left thoracic paraspinal muscle at a single point    MEDICAL DECISION MAKING    Medical records review: see under HPI section.     DATA    Labs:   Lab Results   Component Value Date/Time    SODIUM 138 2013 09:35 AM    POTASSIUM 3.6 2013 09:35 AM    CHLORIDE 104 2013 09:35 AM    CO2 25 2013 09:35 AM    ANION 9.0 2013 09:35 AM    GLUCOSE 109 (H) 2013 09:35 AM    BUN 11 2013 09:35 AM    CREATININE 0.60 2013 09:35 AM    CALCIUM 8.7 2013 09:35 AM    ASTSGOT 32 2013 09:35 AM    ALTSGPT 32 2013 09:35 AM    TBILIRUBIN 0.5 2013 09:35 AM    ALBUMIN 4.0 2013 09:35 AM    TOTPROTEIN 7.1 2013 09:35 AM    GLOBULIN 3.1 2013 09:35 AM    AGRATIO 1.3 2013 09:35 AM   ]    No results found for: \"PROTHROMBTM\", \"INR\"     Lab Results   Component Value Date/Time    WBC 7.5 2013 09:35 AM    RBC 4.73 2013 09:35 AM    HEMOGLOBIN 14.0 2013 09:35 AM    HEMATOCRIT 41.4 2013 09:35 AM    MCV 87.5 2013 09:35 AM    MCH 29.6 2013 09:35 AM    MCHC 33.9 2013 09:35 AM    MPV 5.8 (L) 2013 09:35 AM    NEUTSPOLYS 58.6 2013 09:35 AM    LYMPHOCYTES 35.0 2013 09:35 AM    MONOCYTES 3.1 2013 09:35 AM    EOSINOPHILS 2.5 2013 09:35 AM    BASOPHILS 0.7 2013 09:35 AM        No results found for: \"HBA1C\"     Imaging:   I personally reviewed following images, these are my reads    2025 thoracic x-ray: Very minimal S-shaped curve; degenerative changes; osteophytes; multiple levels of loss of disc height  2025 lumbar x-ray: Degenerative changes; lower level facet arthropathy L4-L5 and L5-S1; anterolisthesis L4; osteophytes    IMAGING radiology reads. I reviewed the following radiology reads                                                                                                                         ASSESSMENT AND PLAN:  Ping Yeboah   : 1975 "   Past medical history of migraines, GERD, iron deficiency, cervical radiculopathy, GERD, uterine adnexa, trigger finger of the right hand    Diagnoses and all orders for this visit:  1. Myalgia  Consent for all Surgical, Special Diagnostic or Therapeutic Procedures    Referral to Physical Therapy      2. Spondylosis  Referral to Physical Therapy      3. Facet arthritis of lumbar region  Referral to Physical Therapy      4. Spondylolisthesis of lumbar region  Referral to Physical Therapy      5. Exercise counseling  Referral to Physical Therapy      6. Gluteal pain  Referral to Physical Therapy      7. Dorsalgia  Referral to Physical Therapy      8. Leg length discrepancy  Referral to Physical Therapy              Assessment & Plan  Myalgia:  - The trigger point injections administered two weeks ago provided significant relief for approximately five days. The pain level during this period was reduced, although the pain returned on Saturday.  - A repeat of the trigger point injections is recommended today, with the addition of a corticosteroid for therapeutic treatment.  - The potential side effects of corticosteroids, including localized toxic effects to muscles, bones, and cartilage, weight gain, increased glucose levels, flushing, elevated blood pressure, anxiety, and sleep issues, were discussed. The risks of pain, bleeding, and infection were also explained.  - The procedure will be performed today.    Spondylosis continue with physical therapy  Spondylolisthesis continue with physical therapy  Reported leg length discrepancy continue physical therapy but also proceed with x-ray      PROCEDURE  Procedure Performed  Trigger point injections with corticosteroid    -will endorse current medication regimen by the PCP. I will not be adding or changing medications.  Asides from muscle relaxants and NSAIDs, gabapentin and other antispasmodics can be considered for pain control.    -Recommend vitamin D screening;  Hypovitaminosis D can contribute to diffuse musculoskeletal aches, fatigue, and malaise and can affect exercise and therapy    Orders Placed This Encounter    Referral to Physical Therapy    lidocaine (Xylocaine) 1 % injection 15 mL    dexamethasone (Decadron) injection 4 mg    Consent for all Surgical, Special Diagnostic or Therapeutic Procedures       -Medications/Modalities:  see above  -Therapy (PT/OT/Aquatherapy): encouraged to continue at [facility]  -Home exercise program: encouraged    -Diagnostic workup: reviewed today as above; ordered X-ray  -Interventional program: will perform with this visit  Patient would like to proceed with injection of thoracic and lumbar paraspinal and gluteus muscle muscles.  The risks, benefits, and alternatives to this procedure were discussed and the patient wishes to proceed with the procedure. Risks include but are not limited to damage to surrounding structures, infection, bleeding, worsening of pain which can be permanent, and weakness which can be permanent. Benefits include pain relief and improved function. Alternatives include not doing the procedure.   Discussed that for safety reasons to minimize systemic SE of steroids which can include altering the HPA axis, increase glucose levels, insomnia, immunosuppresion, irritability, weight gain, deteriorate cartilage, and decrease bone mineral density, I would ideally space out steroid injections by at least 3 months.  -Referrals: none required at this time  -Outside records requested: The patient signed Outside Records Request Form for her outside records including images. This [facility] Ramin Ashton DPT     Please note that this dictation was created using voice recognition software. I have made every reasonable attempt to correct obvious errors but there may be errors of grammar and content that I may have overlooked prior to finalization of this note.      Bonilla Nickerson, DO  Interventional Pain  and Spine  Physical Medicine and Rehabilitation  [facility]         CC Pelon Sanchez M.D.   CC Patricia Long A.P*           [1] History reviewed. No pertinent past medical history.  [2] History reviewed. No pertinent surgical history.  [3]   Outpatient Medications Marked as Taking for the 7/17/25 encounter (Office Visit) with Bonilla Nickerson D.O.   Medication Sig Dispense Refill    methylPREDNISolone (MEDROL DOSEPAK) 4 MG Tablet Therapy Pack Follow schedule on package instructions. 21 tablet 0     Current Facility-Administered Medications for the 7/17/25 encounter (Office Visit) with Bonilla Nickerson D.O.   Medication Dose Route Frequency Provider Last Rate Last Admin    lidocaine (Xylocaine) 1 % injection 15 mL  15 mL Injection Once         dexamethasone (Decadron) injection 4 mg  4 mg Injection Once        [4]   Allergies  Allergen Reactions    Naproxen Hives

## 2025-07-31 ENCOUNTER — OFFICE VISIT (OUTPATIENT)
Dept: PHYSICAL MEDICINE AND REHAB | Facility: MEDICAL CENTER | Age: 50
End: 2025-07-31
Payer: COMMERCIAL

## 2025-07-31 VITALS
DIASTOLIC BLOOD PRESSURE: 78 MMHG | BODY MASS INDEX: 21.16 KG/M2 | TEMPERATURE: 97.7 F | SYSTOLIC BLOOD PRESSURE: 126 MMHG | WEIGHT: 115 LBS | HEIGHT: 62 IN | HEART RATE: 87 BPM | OXYGEN SATURATION: 98 %

## 2025-07-31 DIAGNOSIS — M48.061 SPINAL STENOSIS OF LUMBAR REGION WITHOUT NEUROGENIC CLAUDICATION: ICD-10-CM

## 2025-07-31 DIAGNOSIS — M54.9 DORSALGIA: ICD-10-CM

## 2025-07-31 DIAGNOSIS — M25.551 RIGHT HIP PAIN: ICD-10-CM

## 2025-07-31 DIAGNOSIS — M47.816 FACET ARTHRITIS OF LUMBAR REGION: ICD-10-CM

## 2025-07-31 DIAGNOSIS — M47.9 SPONDYLOSIS: ICD-10-CM

## 2025-07-31 DIAGNOSIS — M43.16 SPONDYLOLISTHESIS OF LUMBAR REGION: ICD-10-CM

## 2025-07-31 DIAGNOSIS — M79.10 MYALGIA: Primary | ICD-10-CM

## 2025-07-31 DIAGNOSIS — Z71.82 EXERCISE COUNSELING: ICD-10-CM

## 2025-07-31 ASSESSMENT — PATIENT HEALTH QUESTIONNAIRE - PHQ9: CLINICAL INTERPRETATION OF PHQ2 SCORE: 0

## 2025-07-31 ASSESSMENT — PAIN SCALES - GENERAL: PAINLEVEL_OUTOF10: 5=MODERATE PAIN

## 2025-07-31 NOTE — PROGRESS NOTES
Physiatry (Physical Medicine and  Rehabilitation)       Patient Name: Ping Yeboah   Patient : 1975  PCP: Pelon Sanchez M.D.  MRN: [id number]     Date of service: See epic      CHIEF COMPLAINT  Chief Complaint   Patient presents with    Follow-Up     2wk fv post inj         Ping Yeboah is a very pleasant Ukrainian-speaking female  who presents today with The primary encounter diagnosis was Myalgia. Diagnoses of Spondylosis, Facet arthritis of lumbar region, Spondylolisthesis of lumbar region, Dorsalgia, Exercise counseling, Right hip pain, and Spinal stenosis of lumbar region without neurogenic claudication were also pertinent to this visit.    Verbal consent was obtained for Herb copilot: Yes      Medical records review:  I reviewed the note from the referring provider Patricia Long A.P* including the note dated 25.    Prior Relevant MSK/Interventional Procedures:   7/3/25 Right trapezius and levator scapula and rhomboids and supraspinatus; bilateral thoracic paraspinal muscles; bilateral thoracic lumbar paraspinal muscles; right gluteus rick trigger point injections w/o CS  25 Right trapezius and levator scapula and rhomboids and supraspinatus; right thoracic paraspinal muscles; bilateral lumbar paraspinal muscles; right gluteus rick trigger point injections w/CS    My prior notes  - PCP had prescribed her meloxicam Flexeril and gabapentin which did not alleviate her symptoms      HPI:     services were used in the patient's primary language of Ukrainian.      Name or Number: Ehsan [id number]  Mode of interpretation: iPad    History of Present Illness  The patient is a 49-year-old female presenting for a follow-up visit. She received trigger point injections on 2025 with corticosteroid. She is accompanied by her  and a .    Two weeks ago, she received injections in specific areas, which provided some relief for her back pain. However, the  pain in her hips remains unchanged. She estimates a 50% improvement in her neck and between her shoulder blades. Pain persists in the lower back when bending and standing up, regardless of the duration. After prolonged sitting, cramps occur in her hips upon standing and taking a step. Pain is most severe in the mornings upon waking. She continues to take gabapentin and Flexeril, but these medications have not been effective. Additionally, she is seeing a different physical therapist that her primary care physician referred her to.    ROS:   Fever, Chills, Sweats: Denies  Involuntary Weight Loss: Denies  Bowel/bladder issues: denies   See HPI.   All other systems reviewed and negative.     OCCUPATIONAL history:    HOBBIES/ACTIVITIES:     GOALS OF TREATMENT: Symptom/Pain relief. improve function.      Psychological testing for pain as depression and pain commonly coexist and need to both be treated.     Opioid Risk Score: No value filed.      Interpretation of Opioid Risk Score   Score 0-3 = Low risk of abuse. Do UDS at least once per year.  Score 4-7 = Moderate risk of abuse. Do UDS 1-4 times per year.  Score 8+ = High risk of abuse. Refer to specialist.    PHQ      7/3/2025     2:40 PM 7/17/2025     4:00 PM 7/31/2025     4:40 PM   Depression Screen (PHQ-2/PHQ-9)   PHQ-2 Total Score 0 0 0       Interpretation of PHQ-9 Total Score   Score Severity   1-4 No Depression   5-9 Mild Depression   10-14 Moderate Depression   15-19 Moderately Severe Depression   20-27 Severe Depression      PMHx:   Past Medical History[1]    PSHx:   Past Surgical History[2]    Family history   History reviewed. No pertinent family history.    Medications: reviewed on epic.   Active Medications[3]     Allergies:   Allergies[4]    Social Hx:   Social History     Socioeconomic History    Marital status: Single     Spouse name: Not on file    Number of children: Not on file    Years of education: Not on file    Highest education level:  "Not on file   Occupational History    Not on file   Tobacco Use    Smoking status: Never    Smokeless tobacco: Never   Vaping Use    Vaping status: Never Used   Substance and Sexual Activity    Alcohol use: Never    Drug use: Never    Sexual activity: Not on file   Other Topics Concern    Not on file   Social History Narrative    Not on file     Social Drivers of Health     Financial Resource Strain: Not on file   Food Insecurity: Not on file   Transportation Needs: Not on file   Physical Activity: Not on file   Stress: Not on file   Social Connections: Not on file   Intimate Partner Violence: Not on file   Housing Stability: Not on file         EXAMINATION   Vitals: /78 (BP Location: Right arm, Patient Position: Sitting, BP Cuff Size: Adult)   Pulse 87   Temp 36.5 °C (97.7 °F) (Temporal)   Ht 1.575 m (5' 2\")   Wt 52.2 kg (115 lb)   SpO2 98%   Physical Exam:     A chaperone was offered to the patient during today's exam. Chaperone name: Evelyn Nuñez MA was present.      Body Habitus: Body mass index is 21.03 kg/m².  Appearance: Well-groomed, well-nourished, not disheveled  Eyes: No scleral icterus to suggest severe liver disease, no proptosis to suggest severe hyperthyroid  ENT -no obvious auditory deficits, no external lesions, moist mucus membranes   Skin -no rashes or lesions noted. No appreciable skin breakdown on exposed skin areas.    Respiratory-  breathing comfortably on room air, no audible wheezing, full sentences  Cardiovascular- No lower extremity edema noted.   Psychiatric- alert and oriented, calm, comfortable, cooperative     Musculoskeletal and Neuro:  Gait and station - normal gait with reciprocal pattern,  no presence/use of ambulatory device, no arm assistance with sit-to-stand, nonantalgic. no loss of balance during exam.  change in patient's demeanor with exam.    Grossly normal cranial nerve exam  Coordination grossly intact  Physical Exam  Musculoskeletal  - Right trapezius, " "rhomboids, levator scapula region: No tenderness to palpation  - Right T7 paraspinal muscle: Tenderness to palpation  - Upper gluteus rick: Pain reported    Prior physical exam  Reproducible tenderness to palpation right lumbar and thoracic paraspinal muscles and in right gluteus rick muscles and lower left thoracic paraspinal muscle at a single point    MEDICAL DECISION MAKING    Medical records review: see under HPI section.     DATA    Labs:   Lab Results   Component Value Date/Time    SODIUM 138 05/13/2013 09:35 AM    POTASSIUM 3.6 05/13/2013 09:35 AM    CHLORIDE 104 05/13/2013 09:35 AM    CO2 25 05/13/2013 09:35 AM    ANION 9.0 05/13/2013 09:35 AM    GLUCOSE 109 (H) 05/13/2013 09:35 AM    BUN 11 05/13/2013 09:35 AM    CREATININE 0.60 05/13/2013 09:35 AM    CALCIUM 8.7 05/13/2013 09:35 AM    ASTSGOT 32 05/13/2013 09:35 AM    ALTSGPT 32 05/13/2013 09:35 AM    TBILIRUBIN 0.5 05/13/2013 09:35 AM    ALBUMIN 4.0 05/13/2013 09:35 AM    TOTPROTEIN 7.1 05/13/2013 09:35 AM    GLOBULIN 3.1 05/13/2013 09:35 AM    AGRATIO 1.3 05/13/2013 09:35 AM   ]    No results found for: \"PROTHROMBTM\", \"INR\"     Lab Results   Component Value Date/Time    WBC 7.5 05/13/2013 09:35 AM    RBC 4.73 05/13/2013 09:35 AM    HEMOGLOBIN 14.0 05/13/2013 09:35 AM    HEMATOCRIT 41.4 05/13/2013 09:35 AM    MCV 87.5 05/13/2013 09:35 AM    MCH 29.6 05/13/2013 09:35 AM    MCHC 33.9 05/13/2013 09:35 AM    MPV 5.8 (L) 05/13/2013 09:35 AM    NEUTSPOLYS 58.6 05/13/2013 09:35 AM    LYMPHOCYTES 35.0 05/13/2013 09:35 AM    MONOCYTES 3.1 05/13/2013 09:35 AM    EOSINOPHILS 2.5 05/13/2013 09:35 AM    BASOPHILS 0.7 05/13/2013 09:35 AM        No results found for: \"HBA1C\"     Imaging:   I personally reviewed following images, these are my reads    6/29/2025 thoracic x-ray: Very minimal S-shaped curve; degenerative changes; osteophytes; multiple levels of loss of disc height  6/29/2025 lumbar x-ray: Degenerative changes; lower level facet arthropathy L4-L5 and " L5-S1; anterolisthesis L4; osteophytes    IMAGING radiology reads. I reviewed the following radiology reads                                                                                                                         ASSESSMENT AND PLAN:  Ping Yeboah   : 1975   Past medical history of migraines, GERD, iron deficiency, cervical radiculopathy, GERD, uterine adnexa, trigger finger of the right hand    Diagnoses and all orders for this visit:  1. Myalgia  Referral to Physical Therapy      2. Spondylosis  Referral to Physical Therapy      3. Facet arthritis of lumbar region  Referral to Physical Therapy      4. Spondylolisthesis of lumbar region  Referral to Physical Therapy      5. Dorsalgia  Referral to Physical Therapy      6. Exercise counseling  Referral to Physical Therapy      7. Right hip pain  DX-HIP-BILATERAL-WITH PELVIS-2 VIEWS    Referral to Physical Therapy      8. Spinal stenosis of lumbar region without neurogenic claudication                  Assessment & Plan  Myalgia lumbar and dorsalgia and gluteus:  The recent injection has provided approximately 50% relief in certain areas, but some regions continue to experience discomfort. Physical examination revealed no tenderness to palpation along the right trapezius, rhomboids, levator scapula region, but tenderness was noted at the right T7 paraspinal muscle. An x-ray of the hips will be ordered. She is encouraged to engage in physical therapy when possible. If she is already working with another therapist, she should inform us so we can ensure they are addressing all affected areas. Her prescription slip will be updated for [facility], and she needs to release her notes to the therapist. Continue with current medication doses, apply ice or heat as needed, and maintain stretching exercises. A set of exercises for her lower back has been provided in Indonesian. The possibility of repeating the injections without steroids in  approximately 2 to 4 weeks was discussed, and she expressed interest in doing so in 4 weeks.        Spondylosis  Spondylolisthesis  Lumbar stenosis without neural logical claudication  Findings on MRI.  Will review.  Will encourage patient to participate in physical therapy with a consideration of an epidural steroid injection.    Right hip pain  Suspect this is myalgia but will obtain hip x-ray.    [facility] Updated physical therapy slip.    Follow-up: 08/22/2025.    -will endorse current medication regimen by the PCP.  I will not be adding or changing medications at this time.     -Recommend vitamin D screening; Hypovitaminosis D can contribute to diffuse musculoskeletal aches, fatigue, and malaise and can affect exercise and therapy    Orders Placed This Encounter    DX-HIP-BILATERAL-WITH PELVIS-2 VIEWS    Referral to Physical Therapy       -Medications/Modalities:  see above  -Therapy (PT/OT/Aquatherapy): encouraged to continue at [facility]  -Home exercise program: encouraged    -Diagnostic workup: reviewed today as above; ordered X-ray  -Interventional program: will schedule an outpatient procedure at a later time: 4 weeks for repeat TPI  -Referrals: none required at this time  -Outside records requested: The patient signed Outside Records Request Form for her outside records including images. This includes the records from [facility]     Please note that this dictation was created using voice recognition software. I have made every reasonable attempt to correct obvious errors but there may be errors of grammar and content that I may have overlooked prior to finalization of this note.      Bonilla Nickerson DO  Interventional Pain and Spine  Physical Medicine and Rehabilitation  [facility]         CC Pelon aSnchez M.D.   Patricia Avina, A.P*             [1] History reviewed. No pertinent past medical history.  [2] History reviewed. No pertinent surgical  history.  [3]   No outpatient medications have been marked as taking for the 7/31/25 encounter (Office Visit) with Bonilla Nickerson D.O..   [4]   Allergies  Allergen Reactions    Naproxen Hives

## 2025-07-31 NOTE — Clinical Note
Patricia Long  I had the pleasure of evaluating Ping Yeboah.  She had 50% relief with her trigger point injections but still continues to have chronic pain.  From a medication standpoint, please consider increasing the gabapentin and/or switching her Flexeril.  Please also consider SNRIs or TCAs if needed.  Hypovitaminosis can lead to muscle fatigue and bone pain.  Please consider vitamin D level  Best regards, Dr Nickerson

## 2025-08-17 ENCOUNTER — HOSPITAL ENCOUNTER (OUTPATIENT)
Dept: RADIOLOGY | Facility: MEDICAL CENTER | Age: 50
End: 2025-08-17
Attending: GENERAL PRACTICE
Payer: COMMERCIAL

## 2025-08-17 DIAGNOSIS — M25.551 RIGHT HIP PAIN: ICD-10-CM

## 2025-08-17 PROCEDURE — 73521 X-RAY EXAM HIPS BI 2 VIEWS: CPT

## 2025-08-22 ENCOUNTER — OFFICE VISIT (OUTPATIENT)
Dept: PHYSICAL MEDICINE AND REHAB | Facility: MEDICAL CENTER | Age: 50
End: 2025-08-22
Payer: COMMERCIAL

## 2025-08-22 VITALS
HEIGHT: 62 IN | TEMPERATURE: 98.9 F | WEIGHT: 116.18 LBS | DIASTOLIC BLOOD PRESSURE: 94 MMHG | BODY MASS INDEX: 21.38 KG/M2 | SYSTOLIC BLOOD PRESSURE: 136 MMHG | HEART RATE: 74 BPM | OXYGEN SATURATION: 99 %

## 2025-08-22 DIAGNOSIS — M54.9 DORSALGIA: ICD-10-CM

## 2025-08-22 DIAGNOSIS — Z71.82 EXERCISE COUNSELING: ICD-10-CM

## 2025-08-22 DIAGNOSIS — M54.2 CHRONIC NECK AND BACK PAIN: ICD-10-CM

## 2025-08-22 DIAGNOSIS — G89.29 CHRONIC NECK AND BACK PAIN: ICD-10-CM

## 2025-08-22 DIAGNOSIS — M54.9 CHRONIC NECK AND BACK PAIN: ICD-10-CM

## 2025-08-22 DIAGNOSIS — M79.10 MYALGIA: Primary | ICD-10-CM

## 2025-08-22 PROCEDURE — 3080F DIAST BP >= 90 MM HG: CPT | Performed by: GENERAL PRACTICE

## 2025-08-22 PROCEDURE — 20553 NJX 1/MLT TRIGGER POINTS 3/>: CPT | Performed by: GENERAL PRACTICE

## 2025-08-22 PROCEDURE — 3075F SYST BP GE 130 - 139MM HG: CPT | Performed by: GENERAL PRACTICE

## 2025-08-22 PROCEDURE — 1125F AMNT PAIN NOTED PAIN PRSNT: CPT | Performed by: GENERAL PRACTICE

## 2025-08-22 PROCEDURE — 76942 ECHO GUIDE FOR BIOPSY: CPT | Performed by: GENERAL PRACTICE

## 2025-08-22 RX ORDER — BUPIVACAINE HYDROCHLORIDE 5 MG/ML
20 INJECTION, SOLUTION EPIDURAL; INTRACAUDAL; PERINEURAL ONCE
Status: COMPLETED | OUTPATIENT
Start: 2025-08-22 | End: 2025-08-22

## 2025-08-22 RX ORDER — BUPIVACAINE HYDROCHLORIDE 5 MG/ML
10 INJECTION, SOLUTION EPIDURAL; INTRACAUDAL; PERINEURAL ONCE
Status: DISCONTINUED | OUTPATIENT
Start: 2025-08-22 | End: 2025-08-22

## 2025-08-22 RX ORDER — MELOXICAM 7.5 MG/1
7.5 TABLET ORAL DAILY
COMMUNITY

## 2025-08-22 RX ADMIN — Medication 10 ML: at 14:47

## 2025-08-22 RX ADMIN — BUPIVACAINE HYDROCHLORIDE 20 ML: 5 INJECTION, SOLUTION EPIDURAL; INTRACAUDAL; PERINEURAL at 14:47

## 2025-08-22 ASSESSMENT — LIFESTYLE VARIABLES
AUDIT-C TOTAL SCORE: 0
SKIP TO QUESTIONS 9-10: 1
HOW MANY STANDARD DRINKS CONTAINING ALCOHOL DO YOU HAVE ON A TYPICAL DAY: PATIENT DOES NOT DRINK
HOW OFTEN DO YOU HAVE SIX OR MORE DRINKS ON ONE OCCASION: NEVER
HOW OFTEN DO YOU HAVE A DRINK CONTAINING ALCOHOL: NEVER

## 2025-08-22 ASSESSMENT — PAIN SCALES - GENERAL: PAINLEVEL_OUTOF10: 4=SLIGHT-MODERATE PAIN

## 2025-08-22 ASSESSMENT — PATIENT HEALTH QUESTIONNAIRE - PHQ9: CLINICAL INTERPRETATION OF PHQ2 SCORE: 0
